# Patient Record
Sex: MALE | Race: WHITE | NOT HISPANIC OR LATINO | Employment: FULL TIME | URBAN - METROPOLITAN AREA
[De-identification: names, ages, dates, MRNs, and addresses within clinical notes are randomized per-mention and may not be internally consistent; named-entity substitution may affect disease eponyms.]

---

## 2020-02-11 ENCOUNTER — APPOINTMENT (OUTPATIENT)
Dept: RADIOLOGY | Facility: MEDICAL CENTER | Age: 45
End: 2020-02-11
Payer: OTHER MISCELLANEOUS

## 2020-02-11 ENCOUNTER — APPOINTMENT (OUTPATIENT)
Dept: URGENT CARE | Facility: MEDICAL CENTER | Age: 45
End: 2020-02-11
Payer: OTHER MISCELLANEOUS

## 2020-02-11 DIAGNOSIS — S20.211A CONTUSION OF RIB ON RIGHT SIDE, INITIAL ENCOUNTER: ICD-10-CM

## 2020-02-11 DIAGNOSIS — S20.211A CONTUSION OF RIB ON RIGHT SIDE, INITIAL ENCOUNTER: Primary | ICD-10-CM

## 2020-02-11 PROCEDURE — 99283 EMERGENCY DEPT VISIT LOW MDM: CPT | Performed by: PHYSICIAN ASSISTANT

## 2020-02-11 PROCEDURE — 71101 X-RAY EXAM UNILAT RIBS/CHEST: CPT

## 2020-02-11 PROCEDURE — G0382 LEV 3 HOSP TYPE B ED VISIT: HCPCS | Performed by: PHYSICIAN ASSISTANT

## 2020-02-14 ENCOUNTER — APPOINTMENT (OUTPATIENT)
Dept: URGENT CARE | Facility: MEDICAL CENTER | Age: 45
End: 2020-02-14
Payer: OTHER MISCELLANEOUS

## 2020-02-14 PROCEDURE — 99213 OFFICE O/P EST LOW 20 MIN: CPT | Performed by: PHYSICIAN ASSISTANT

## 2020-02-17 ENCOUNTER — APPOINTMENT (OUTPATIENT)
Dept: URGENT CARE | Facility: MEDICAL CENTER | Age: 45
End: 2020-02-17
Payer: OTHER MISCELLANEOUS

## 2020-02-17 PROCEDURE — 99213 OFFICE O/P EST LOW 20 MIN: CPT

## 2020-02-20 ENCOUNTER — APPOINTMENT (OUTPATIENT)
Dept: URGENT CARE | Age: 45
End: 2020-02-20
Payer: OTHER MISCELLANEOUS

## 2020-02-20 PROCEDURE — 99213 OFFICE O/P EST LOW 20 MIN: CPT | Performed by: PREVENTIVE MEDICINE

## 2020-02-27 ENCOUNTER — APPOINTMENT (OUTPATIENT)
Dept: URGENT CARE | Facility: MEDICAL CENTER | Age: 45
End: 2020-02-27
Payer: OTHER MISCELLANEOUS

## 2020-02-27 PROCEDURE — 99213 OFFICE O/P EST LOW 20 MIN: CPT | Performed by: PHYSICIAN ASSISTANT

## 2025-01-26 LAB
ATRIAL RATE: 84 BPM
BASOPHILS # BLD AUTO: 0.04 THOUSANDS/ΜL (ref 0–0.1)
BASOPHILS NFR BLD AUTO: 1 % (ref 0–1)
CARDIAC TROPONIN I PNL SERPL HS: 14 NG/L (ref ?–50)
EOSINOPHIL # BLD AUTO: 0.04 THOUSAND/ΜL (ref 0–0.61)
EOSINOPHIL NFR BLD AUTO: 1 % (ref 0–6)
ERYTHROCYTE [DISTWIDTH] IN BLOOD BY AUTOMATED COUNT: 13.1 % (ref 11.6–15.1)
FLUAV AG UPPER RESP QL IA.RAPID: POSITIVE
FLUBV AG UPPER RESP QL IA.RAPID: NEGATIVE
HCT VFR BLD AUTO: 39.8 % (ref 36.5–49.3)
HGB BLD-MCNC: 13.9 G/DL (ref 12–17)
IMM GRANULOCYTES # BLD AUTO: 0.01 THOUSAND/UL (ref 0–0.2)
IMM GRANULOCYTES NFR BLD AUTO: 0 % (ref 0–2)
LYMPHOCYTES # BLD AUTO: 1.48 THOUSANDS/ΜL (ref 0.6–4.47)
LYMPHOCYTES NFR BLD AUTO: 27 % (ref 14–44)
MCH RBC QN AUTO: 30.2 PG (ref 26.8–34.3)
MCHC RBC AUTO-ENTMCNC: 34.9 G/DL (ref 31.4–37.4)
MCV RBC AUTO: 87 FL (ref 82–98)
MONOCYTES # BLD AUTO: 0.94 THOUSAND/ΜL (ref 0.17–1.22)
MONOCYTES NFR BLD AUTO: 17 % (ref 4–12)
NEUTROPHILS # BLD AUTO: 3 THOUSANDS/ΜL (ref 1.85–7.62)
NEUTS SEG NFR BLD AUTO: 54 % (ref 43–75)
NRBC BLD AUTO-RTO: 0 /100 WBCS
P AXIS: 64 DEGREES
PLATELET # BLD AUTO: 197 THOUSANDS/UL (ref 149–390)
PMV BLD AUTO: 9.6 FL (ref 8.9–12.7)
PR INTERVAL: 138 MS
QRS AXIS: 80 DEGREES
QRSD INTERVAL: 88 MS
QT INTERVAL: 362 MS
QTC INTERVAL: 427 MS
RBC # BLD AUTO: 4.6 MILLION/UL (ref 3.88–5.62)
SARS-COV+SARS-COV-2 AG RESP QL IA.RAPID: NEGATIVE
T WAVE AXIS: 75 DEGREES
VENTRICULAR RATE: 84 BPM
WBC # BLD AUTO: 5.51 THOUSAND/UL (ref 4.31–10.16)

## 2025-01-26 PROCEDURE — 36415 COLL VENOUS BLD VENIPUNCTURE: CPT

## 2025-01-26 PROCEDURE — 93010 ELECTROCARDIOGRAM REPORT: CPT | Performed by: STUDENT IN AN ORGANIZED HEALTH CARE EDUCATION/TRAINING PROGRAM

## 2025-01-26 PROCEDURE — 85025 COMPLETE CBC W/AUTO DIFF WBC: CPT

## 2025-01-26 PROCEDURE — 99285 EMERGENCY DEPT VISIT HI MDM: CPT | Performed by: EMERGENCY MEDICINE

## 2025-01-26 PROCEDURE — 93005 ELECTROCARDIOGRAM TRACING: CPT

## 2025-01-26 PROCEDURE — 87811 SARS-COV-2 COVID19 W/OPTIC: CPT

## 2025-01-26 PROCEDURE — 80053 COMPREHEN METABOLIC PANEL: CPT

## 2025-01-26 PROCEDURE — 87804 INFLUENZA ASSAY W/OPTIC: CPT

## 2025-01-26 PROCEDURE — 99285 EMERGENCY DEPT VISIT HI MDM: CPT

## 2025-01-26 PROCEDURE — 84484 ASSAY OF TROPONIN QUANT: CPT

## 2025-01-27 ENCOUNTER — APPOINTMENT (EMERGENCY)
Dept: RADIOLOGY | Facility: HOSPITAL | Age: 50
End: 2025-01-27
Payer: COMMERCIAL

## 2025-01-27 ENCOUNTER — HOSPITAL ENCOUNTER (OUTPATIENT)
Facility: HOSPITAL | Age: 50
Setting detail: OBSERVATION
Discharge: HOME | End: 2025-01-27
Attending: EMERGENCY MEDICINE
Payer: COMMERCIAL

## 2025-01-27 VITALS
HEART RATE: 67 BPM | DIASTOLIC BLOOD PRESSURE: 73 MMHG | BODY MASS INDEX: 39.39 KG/M2 | SYSTOLIC BLOOD PRESSURE: 138 MMHG | WEIGHT: 251 LBS | TEMPERATURE: 99.3 F | OXYGEN SATURATION: 96 % | RESPIRATION RATE: 18 BRPM | HEIGHT: 67 IN

## 2025-01-27 DIAGNOSIS — R09.02 HYPOXIA: ICD-10-CM

## 2025-01-27 DIAGNOSIS — R07.9 CHEST PAIN: ICD-10-CM

## 2025-01-27 DIAGNOSIS — J44.1 COPD WITH ACUTE EXACERBATION (HCC): ICD-10-CM

## 2025-01-27 DIAGNOSIS — J10.1 INFLUENZA A: Primary | ICD-10-CM

## 2025-01-27 DIAGNOSIS — J44.9 COPD, MILD (HCC): ICD-10-CM

## 2025-01-27 PROBLEM — L30.9 DERMATITIS: Status: ACTIVE | Noted: 2023-11-22

## 2025-01-27 PROBLEM — J98.4 RESTRICTIVE LUNG DISEASE: Status: ACTIVE | Noted: 2024-03-18

## 2025-01-27 PROBLEM — G47.33 SEVERE OBSTRUCTIVE SLEEP APNEA: Status: ACTIVE | Noted: 2021-04-30

## 2025-01-27 PROBLEM — I95.9 HYPOTENSION: Status: RESOLVED | Noted: 2025-01-27 | Resolved: 2025-01-27

## 2025-01-27 PROBLEM — K04.7 DENTAL ABSCESS: Status: ACTIVE | Noted: 2025-01-27

## 2025-01-27 PROBLEM — F34.1 PERSISTENT DEPRESSIVE DISORDER: Status: ACTIVE | Noted: 2023-11-22

## 2025-01-27 PROBLEM — E78.5 HYPERLIPIDEMIA: Status: ACTIVE | Noted: 2020-08-05

## 2025-01-27 PROBLEM — I10 HYPERTENSION, ESSENTIAL: Status: ACTIVE | Noted: 2023-12-06

## 2025-01-27 PROBLEM — Z72.0 TOBACCO USE: Status: ACTIVE | Noted: 2020-08-05

## 2025-01-27 PROBLEM — Z82.49 FAMILY HISTORY OF HEART DISEASE IN MALE FAMILY MEMBER BEFORE AGE 55: Status: ACTIVE | Noted: 2021-03-22

## 2025-01-27 PROBLEM — Z86.16 HISTORY OF COVID-19: Status: ACTIVE | Noted: 2021-09-29

## 2025-01-27 PROBLEM — I95.9 HYPOTENSION: Status: ACTIVE | Noted: 2025-01-27

## 2025-01-27 LAB
4HR DELTA HS TROPONIN: -4 NG/L
ALBUMIN SERPL BCG-MCNC: 4.4 G/DL (ref 3.5–5)
ALP SERPL-CCNC: 45 U/L (ref 34–104)
ALT SERPL W P-5'-P-CCNC: 32 U/L (ref 7–52)
ANION GAP SERPL CALCULATED.3IONS-SCNC: 9 MMOL/L (ref 4–13)
AST SERPL W P-5'-P-CCNC: 22 U/L (ref 13–39)
BILIRUB SERPL-MCNC: 0.33 MG/DL (ref 0.2–1)
BNP SERPL-MCNC: 26 PG/ML (ref 0–100)
BUN SERPL-MCNC: 18 MG/DL (ref 5–25)
CALCIUM SERPL-MCNC: 8.7 MG/DL (ref 8.4–10.2)
CARDIAC TROPONIN I PNL SERPL HS: 10 NG/L (ref ?–50)
CARDIAC TROPONIN I PNL SERPL HS: 8 NG/L (ref ?–50)
CHLORIDE SERPL-SCNC: 100 MMOL/L (ref 96–108)
CO2 SERPL-SCNC: 26 MMOL/L (ref 21–32)
CREAT SERPL-MCNC: 0.94 MG/DL (ref 0.6–1.3)
D DIMER PPP FEU-MCNC: <0.27 UG/ML FEU
GFR SERPL CREATININE-BSD FRML MDRD: 93 ML/MIN/1.73SQ M
GLUCOSE SERPL-MCNC: 110 MG/DL (ref 65–140)
LACTATE SERPL-SCNC: 0.8 MMOL/L (ref 0.5–2)
MAGNESIUM SERPL-MCNC: 1.8 MG/DL (ref 1.9–2.7)
POTASSIUM SERPL-SCNC: 3.8 MMOL/L (ref 3.5–5.3)
PROCALCITONIN SERPL-MCNC: 0.12 NG/ML
PROT SERPL-MCNC: 7.1 G/DL (ref 6.4–8.4)
SODIUM SERPL-SCNC: 135 MMOL/L (ref 135–147)

## 2025-01-27 PROCEDURE — 94760 N-INVAS EAR/PLS OXIMETRY 1: CPT

## 2025-01-27 PROCEDURE — 83880 ASSAY OF NATRIURETIC PEPTIDE: CPT

## 2025-01-27 PROCEDURE — 84145 PROCALCITONIN (PCT): CPT

## 2025-01-27 PROCEDURE — 87040 BLOOD CULTURE FOR BACTERIA: CPT

## 2025-01-27 PROCEDURE — 71045 X-RAY EXAM CHEST 1 VIEW: CPT

## 2025-01-27 PROCEDURE — 96375 TX/PRO/DX INJ NEW DRUG ADDON: CPT

## 2025-01-27 PROCEDURE — 83735 ASSAY OF MAGNESIUM: CPT

## 2025-01-27 PROCEDURE — 99239 HOSP IP/OBS DSCHRG MGMT >30: CPT

## 2025-01-27 PROCEDURE — 84484 ASSAY OF TROPONIN QUANT: CPT

## 2025-01-27 PROCEDURE — 36415 COLL VENOUS BLD VENIPUNCTURE: CPT

## 2025-01-27 PROCEDURE — 94640 AIRWAY INHALATION TREATMENT: CPT

## 2025-01-27 PROCEDURE — 99222 1ST HOSP IP/OBS MODERATE 55: CPT | Performed by: NURSE PRACTITIONER

## 2025-01-27 PROCEDURE — 85379 FIBRIN DEGRADATION QUANT: CPT

## 2025-01-27 PROCEDURE — 96365 THER/PROPH/DIAG IV INF INIT: CPT

## 2025-01-27 PROCEDURE — 83605 ASSAY OF LACTIC ACID: CPT

## 2025-01-27 PROCEDURE — 94664 DEMO&/EVAL PT USE INHALER: CPT

## 2025-01-27 PROCEDURE — 96361 HYDRATE IV INFUSION ADD-ON: CPT

## 2025-01-27 RX ORDER — METOPROLOL SUCCINATE 50 MG/1
50 TABLET, EXTENDED RELEASE ORAL DAILY
COMMUNITY
Start: 2025-01-22 | End: 2025-04-22

## 2025-01-27 RX ORDER — SODIUM CHLORIDE FOR INHALATION 0.9 %
3 VIAL, NEBULIZER (ML) INHALATION
Status: DISCONTINUED | OUTPATIENT
Start: 2025-01-27 | End: 2025-01-27 | Stop reason: HOSPADM

## 2025-01-27 RX ORDER — GUAIFENESIN 100 MG/5ML
200 SOLUTION ORAL EVERY 4 HOURS PRN
Status: DISCONTINUED | OUTPATIENT
Start: 2025-01-27 | End: 2025-01-27 | Stop reason: HOSPADM

## 2025-01-27 RX ORDER — AMLODIPINE BESYLATE 5 MG/1
5 TABLET ORAL DAILY
COMMUNITY
Start: 2025-01-22

## 2025-01-27 RX ORDER — OSELTAMIVIR PHOSPHATE 75 MG/1
75 CAPSULE ORAL EVERY 12 HOURS SCHEDULED
Status: DISCONTINUED | OUTPATIENT
Start: 2025-01-27 | End: 2025-01-27 | Stop reason: HOSPADM

## 2025-01-27 RX ORDER — KETOROLAC TROMETHAMINE 30 MG/ML
15 INJECTION, SOLUTION INTRAMUSCULAR; INTRAVENOUS ONCE
Status: COMPLETED | OUTPATIENT
Start: 2025-01-27 | End: 2025-01-27

## 2025-01-27 RX ORDER — SODIUM CHLORIDE, SODIUM LACTATE, POTASSIUM CHLORIDE, CALCIUM CHLORIDE 600; 310; 30; 20 MG/100ML; MG/100ML; MG/100ML; MG/100ML
75 INJECTION, SOLUTION INTRAVENOUS CONTINUOUS
Status: DISCONTINUED | OUTPATIENT
Start: 2025-01-27 | End: 2025-01-27 | Stop reason: HOSPADM

## 2025-01-27 RX ORDER — GUAIFENESIN 100 MG/5ML
200 SOLUTION ORAL EVERY 4 HOURS PRN
Qty: 118 ML | Refills: 0 | Status: SHIPPED | OUTPATIENT
Start: 2025-01-27

## 2025-01-27 RX ORDER — PREDNISONE 20 MG/1
40 TABLET ORAL DAILY
Qty: 10 TABLET | Refills: 0 | Status: SHIPPED | OUTPATIENT
Start: 2025-01-27 | End: 2025-02-01

## 2025-01-27 RX ORDER — AZITHROMYCIN 250 MG/1
500 TABLET, FILM COATED ORAL ONCE
Status: COMPLETED | OUTPATIENT
Start: 2025-01-27 | End: 2025-01-27

## 2025-01-27 RX ORDER — ENOXAPARIN SODIUM 100 MG/ML
40 INJECTION SUBCUTANEOUS DAILY
Status: DISCONTINUED | OUTPATIENT
Start: 2025-01-27 | End: 2025-01-27 | Stop reason: HOSPADM

## 2025-01-27 RX ORDER — ASPIRIN 81 MG/1
81 TABLET ORAL DAILY
COMMUNITY
Start: 2025-01-22

## 2025-01-27 RX ORDER — LANOLIN ALCOHOL/MO/W.PET/CERES
400 CREAM (GRAM) TOPICAL 2 TIMES DAILY
Status: DISCONTINUED | OUTPATIENT
Start: 2025-01-27 | End: 2025-01-27 | Stop reason: HOSPADM

## 2025-01-27 RX ORDER — ALBUTEROL SULFATE 90 UG/1
2 INHALANT RESPIRATORY (INHALATION) EVERY 6 HOURS PRN
COMMUNITY
Start: 2025-01-22

## 2025-01-27 RX ORDER — ACETAMINOPHEN 325 MG/1
975 TABLET ORAL ONCE
Status: COMPLETED | OUTPATIENT
Start: 2025-01-27 | End: 2025-01-27

## 2025-01-27 RX ORDER — ONDANSETRON 2 MG/ML
4 INJECTION INTRAMUSCULAR; INTRAVENOUS ONCE
Status: COMPLETED | OUTPATIENT
Start: 2025-01-27 | End: 2025-01-27

## 2025-01-27 RX ORDER — LEVALBUTEROL INHALATION SOLUTION 1.25 MG/3ML
1.25 SOLUTION RESPIRATORY (INHALATION)
Status: DISCONTINUED | OUTPATIENT
Start: 2025-01-27 | End: 2025-01-27 | Stop reason: HOSPADM

## 2025-01-27 RX ORDER — ESCITALOPRAM OXALATE 5 MG/1
5 TABLET ORAL DAILY
COMMUNITY
Start: 2025-01-22 | End: 2025-02-21

## 2025-01-27 RX ORDER — ACETAMINOPHEN 325 MG/1
650 TABLET ORAL EVERY 6 HOURS PRN
Status: DISCONTINUED | OUTPATIENT
Start: 2025-01-27 | End: 2025-01-27 | Stop reason: HOSPADM

## 2025-01-27 RX ORDER — METHYLPREDNISOLONE SODIUM SUCCINATE 40 MG/ML
40 INJECTION, POWDER, LYOPHILIZED, FOR SOLUTION INTRAMUSCULAR; INTRAVENOUS EVERY 8 HOURS
Status: DISCONTINUED | OUTPATIENT
Start: 2025-01-27 | End: 2025-01-27 | Stop reason: HOSPADM

## 2025-01-27 RX ORDER — ATORVASTATIN CALCIUM 40 MG/1
40 TABLET, FILM COATED ORAL DAILY
COMMUNITY

## 2025-01-27 RX ORDER — OSELTAMIVIR PHOSPHATE 75 MG/1
75 CAPSULE ORAL EVERY 12 HOURS SCHEDULED
Qty: 9 CAPSULE | Refills: 0 | Status: SHIPPED | OUTPATIENT
Start: 2025-01-27 | End: 2025-02-01

## 2025-01-27 RX ORDER — ESCITALOPRAM OXALATE 20 MG/1
20 TABLET ORAL DAILY
COMMUNITY
Start: 2025-02-22 | End: 2025-08-21

## 2025-01-27 RX ORDER — OSELTAMIVIR PHOSPHATE 75 MG/1
75 CAPSULE ORAL ONCE
Status: COMPLETED | OUTPATIENT
Start: 2025-01-27 | End: 2025-01-27

## 2025-01-27 RX ORDER — ALBUTEROL SULFATE 90 UG/1
2 INHALANT RESPIRATORY (INHALATION) ONCE
Status: COMPLETED | OUTPATIENT
Start: 2025-01-27 | End: 2025-01-27

## 2025-01-27 RX ADMIN — IPRATROPIUM BROMIDE 0.5 MG: 0.5 SOLUTION RESPIRATORY (INHALATION) at 08:52

## 2025-01-27 RX ADMIN — SODIUM CHLORIDE 1000 ML: 0.9 INJECTION, SOLUTION INTRAVENOUS at 04:24

## 2025-01-27 RX ADMIN — AMOXICILLIN AND CLAVULANATE POTASSIUM 1 TABLET: 875; 125 TABLET, FILM COATED ORAL at 12:08

## 2025-01-27 RX ADMIN — SODIUM CHLORIDE, SODIUM LACTATE, POTASSIUM CHLORIDE, AND CALCIUM CHLORIDE 75 ML/HR: .6; .31; .03; .02 INJECTION, SOLUTION INTRAVENOUS at 11:27

## 2025-01-27 RX ADMIN — Medication 3 ML: at 08:53

## 2025-01-27 RX ADMIN — Medication 400 MG: at 11:21

## 2025-01-27 RX ADMIN — LEVALBUTEROL HYDROCHLORIDE 1.25 MG: 1.25 SOLUTION RESPIRATORY (INHALATION) at 08:53

## 2025-01-27 RX ADMIN — ACETAMINOPHEN 975 MG: 325 TABLET, FILM COATED ORAL at 03:06

## 2025-01-27 RX ADMIN — CEFTRIAXONE 2000 MG: 2 INJECTION, POWDER, FOR SOLUTION INTRAMUSCULAR; INTRAVENOUS at 04:21

## 2025-01-27 RX ADMIN — OSELTAMIVIR PHOSPHATE 75 MG: 75 CAPSULE ORAL at 03:08

## 2025-01-27 RX ADMIN — ALBUTEROL SULFATE 2 PUFF: 90 AEROSOL, METERED RESPIRATORY (INHALATION) at 03:08

## 2025-01-27 RX ADMIN — KETOROLAC TROMETHAMINE 15 MG: 30 INJECTION, SOLUTION INTRAMUSCULAR; INTRAVENOUS at 03:31

## 2025-01-27 RX ADMIN — AZITHROMYCIN DIHYDRATE 500 MG: 250 TABLET ORAL at 03:50

## 2025-01-27 RX ADMIN — METHYLPREDNISOLONE SODIUM SUCCINATE 40 MG: 40 INJECTION, POWDER, FOR SOLUTION INTRAMUSCULAR; INTRAVENOUS at 11:22

## 2025-01-27 RX ADMIN — ENOXAPARIN SODIUM 40 MG: 40 INJECTION SUBCUTANEOUS at 12:09

## 2025-01-27 RX ADMIN — SODIUM CHLORIDE 1000 ML: 0.9 INJECTION, SOLUTION INTRAVENOUS at 03:31

## 2025-01-27 RX ADMIN — ONDANSETRON 4 MG: 2 INJECTION INTRAMUSCULAR; INTRAVENOUS at 03:31

## 2025-01-27 NOTE — LETTER
[facility] EMERGENCY DEPARTMENT  [address]  [place] [address]  Dept: [phone]    January 27, 2025     Patient: Angel Luis Orozco   YOB: 1975   Date of Visit: 1/27/2025       To Whom it May Concern:    Angel Luis Orozco is under my professional care. He was seen in the hospital from 1/27/2025 to 01/27/25. He may return to work on 1/30 without limitations.    If you have any questions or concerns, please don't hesitate to call.         Sincerely,          Arin Suarez, DO

## 2025-01-27 NOTE — ED ATTENDING ATTESTATION
1/26/2025  I, Ta Granger MD, saw and evaluated the patient. I have discussed the patient with the resident/non-physician practitioner and agree with the resident's/non-physician practitioner's findings, Plan of Care, and MDM as documented in the resident's/non-physician practitioner's note, except where noted. All available labs and Radiology studies were reviewed.  I was present for key portions of any procedure(s) performed by the resident/non-physician practitioner and I was immediately available to provide assistance.       At this point I agree with the current assessment done in the Emergency Department.  I have conducted an independent evaluation of this patient a history and physical is as follows: Patient is a 49 year old male with cough, fever, nausea, fatigue since yesterday. No travel. No chest pain or sob. Was last seen at [facility] on 1/22/25 for annual physical exam. [employer] website checked on this patient and last Rx filled was on 11/14/23 for tramadol for 3 day supply. No urinary sx or diarrhea. No smoking. NCAT. No scleral icterus. Moist mucous membranes. ENT exam as per ED resident. Lungs with rhonci and occasional wheezing. Heart regular without murmur. Abdomen soft and nontender. (+) bowel sounds. No edema. No rash noted. DDx including but not limited to: viral illness, pneumonia, URI, OM, pharyngitis, influenza, COVID-19 (novel coronavirus); doubt cellulitis, UTI, meningitis, meningococcemia, sinusitis or cardiac etiology. Will check labs, EKG, CXR.      ED Course         Critical Care Time  Procedures

## 2025-01-27 NOTE — ASSESSMENT & PLAN NOTE
Completing course of augmentin due to dental abscess.  Has about 4 more days left.   Patient will schedule op follow up

## 2025-01-27 NOTE — H&P
H&P - Hospitalist   Name: Angel Luis Orozco 49 y.o. male I MRN: [id number]  Unit/Bed#: TRIAGE-01 I Date of Admission: 1/27/2025   Date of Service: 1/27/2025 I Hospital Day: 0     Assessment & Plan  Hypotension  92/63, improved with IV fluids   Continue IV fluids  Check orthostatics  Influenza A  Symptom onset 1/25.  Sick contacts at work.   Tamiflu   Supportive care   COPD with acute exacerbation (HCC)  Likely in setting of influenza  89-90% on room air  Solumedrol  Schedule nebs  Check ambulating pulse ox  Severe obstructive sleep apnea  Cpap hs  Dental abscess  Completing course of augmentin due to dental abscess.  Has about 4 more days left.   Patient will schedule op follow up  Hypertension, essential  Has not been on antihypertensives over the past year due to lapse in insurance  Agreeable to resume on dc once BP improves  Persistent depressive disorder  Has not been on lexapro for the past year due to lapse in insurance  Agreeable to resume on dc      VTE Pharmacologic Prophylaxis: VTE Score: 4 High Risk (Score >/= 5) - Pharmacological DVT Prophylaxis Ordered: enoxaparin (Lovenox). Sequential Compression Devices Ordered.  Code Status: Level 1 - Full Code   Discussion with family: Patient declined call to .     Anticipated Length of Stay: Patient will be admitted on an observation basis with an anticipated length of stay of less than 2 midnights secondary to hypotension.    History of Present Illness   Chief Complaint: fever, fatigue, cough    Angel Luis Orozco is a 49 y.o. male with a PMH of copd, htn, eliane on cpap who presents with two days of fever, fatigue, cough.  Reports he hasn't moved too much from the couch since Saturday.  Influenza A positive.  Notes sick contacts with co workers.  No SIRS criteria.  Noted to have initial blood pressure 92/63 which improved with IV fluids.  Per ED he has remained 89-90% on room air.  He has been placed on oxygen.  Ambulating pulse ox and orthostatics not yet  completed while in ED.      Patient is on augmentin as outpatient due to dental abscess.    Review of Systems   Constitutional:  Positive for appetite change, chills, fatigue and fever.   Respiratory:  Positive for cough, shortness of breath and wheezing.    All other systems reviewed and are negative.      Historical Information   History reviewed. No pertinent past medical history.  History reviewed. No pertinent surgical history.  Social History     Tobacco Use    Smoking status: Some Days     Types: Cigarettes    Smokeless tobacco: Never   Vaping Use    Vaping status: Not on file   Substance and Sexual Activity    Alcohol use: Never    Drug use: Never    Sexual activity: Not on file     E-Cigarette/Vaping     E-Cigarette/Vaping Substances    Nicotine No     THC No     CBD No     Flavoring No     Other No     Unknown No      History reviewed. No pertinent family history.  Social History:  Marital Status: /Civil Union   Occupation:   Patient Pre-hospital Living Situation: Home  Patient Pre-hospital Level of Mobility: walks  Patient Pre-hospital Diet Restrictions:     Meds/Allergies   I have reviewed home medications with patient personally.  Prior to Admission medications    Medication Sig Start Date End Date Taking? Authorizing Provider   albuterol (PROVENTIL HFA,VENTOLIN HFA) 90 mcg/act inhaler Inhale 2 puffs every 6 (six) hours as needed 1/22/25  Yes Historical Provider, MD   amLODIPine (NORVASC) 5 mg tablet Take 5 mg by mouth daily 1/22/25  Yes Historical Provider, MD   amoxicillin-clavulanate (AUGMENTIN) 875-125 mg per tablet Take 1 tablet by mouth 2 (two) times a day 1/11/25  Yes Historical Provider, MD   aspirin (ECOTRIN LOW STRENGTH) 81 mg EC tablet Take 81 mg by mouth daily 1/22/25  Yes Historical Provider, MD   escitalopram (LEXAPRO) 5 mg tablet Take 5 mg by mouth daily 1/22/25 2/21/25 Yes Historical Provider, MD   metoprolol succinate (TOPROL-XL) 50 mg 24 hr tablet Take 50 mg by mouth daily  1/22/25 4/22/25 Yes Historical Provider, MD   atorvastatin (LIPITOR) 40 mg tablet Take 40 mg by mouth daily    Historical Provider, MD   escitalopram (LEXAPRO) 20 mg tablet Take 20 mg by mouth daily Do not start before February 22, 2025. 2/22/25 8/21/25  Historical Provider, MD     No Known Allergies    Objective :  Temp:  [99.3 °F (37.4 °C)] 99.3 °F (37.4 °C)  HR:  [79-87] 79  BP: ()/(63) 110/63  Resp:  [18-22] 18  SpO2:  [90 %-97 %] 90 %  O2 Device: None (Room air)    Physical Exam  Constitutional:       General: He is not in acute distress.     Appearance: Normal appearance. He is not ill-appearing.   HENT:      Head: Normocephalic and atraumatic.      Right Ear: External ear normal.      Left Ear: External ear normal.      Nose: Nose normal.      Mouth/Throat:      Pharynx: Oropharynx is clear.   Eyes:      Extraocular Movements: Extraocular movements intact.      Conjunctiva/sclera: Conjunctivae normal.   Cardiovascular:      Rate and Rhythm: Normal rate and regular rhythm.      Pulses: Normal pulses.      Heart sounds: Normal heart sounds.   Pulmonary:      Effort: Pulmonary effort is normal.      Breath sounds: Wheezing present.   Abdominal:      General: Bowel sounds are normal.      Palpations: Abdomen is soft.   Musculoskeletal:         General: Normal range of motion.      Cervical back: Normal range of motion.   Skin:     General: Skin is warm.      Capillary Refill: Capillary refill takes less than 2 seconds.   Neurological:      General: No focal deficit present.      Mental Status: He is alert and oriented to person, place, and time.   Psychiatric:         Behavior: Behavior normal.          Lines/Drains:            Lab Results: I have reviewed the following results:  Results from last 7 days   Lab Units 01/26/25  2313   WBC Thousand/uL 5.51   HEMOGLOBIN g/dL 13.9   HEMATOCRIT % 39.8   PLATELETS Thousands/uL 197   SEGS PCT % 54   LYMPHO PCT % 27   MONO PCT % 17*   EOS PCT % 1     Results from  last 7 days   Lab Units 01/26/25  2313   SODIUM mmol/L 135   POTASSIUM mmol/L 3.8   CHLORIDE mmol/L 100   CO2 mmol/L 26   BUN mg/dL 18   CREATININE mg/dL 0.94   ANION GAP mmol/L 9   CALCIUM mg/dL 8.7   ALBUMIN g/dL 4.4   TOTAL BILIRUBIN mg/dL 0.33   ALK PHOS U/L 45   ALT U/L 32   AST U/L 22   GLUCOSE RANDOM mg/dL 110             Lab Results   Component Value Date    HGBA1C 6 (H) 11/06/2023    HGBA1C 6 (H) 05/02/2022    HGBA1C 6.4 (H) 04/29/2021     Results from last 7 days   Lab Units 01/27/25  0412   LACTIC ACID mmol/L 0.8   PROCALCITONIN ng/ml 0.12       Imaging Results Review: I reviewed radiology reports from this admission including: chest xray.  Other Study Results Review: EKG was reviewed.  EKG was personally reviewed and my interpretation is: Personally Reviewed. NSR. HR 84, PACs..    Administrative Statements   I have spent a total time of   minutes in caring for this patient on the day of the visit/encounter including Diagnostic results, Prognosis, Risks and benefits of tx options, Instructions for management, Patient and family education, Importance of tx compliance, Risk factor reductions, Impressions, Counseling / Coordination of care, Documenting in the medical record, Reviewing / ordering tests, medicine, procedures  , Obtaining or reviewing history  , and Communicating with other healthcare professionals .    ** Please Note: This note has been constructed using a voice recognition system. **

## 2025-01-27 NOTE — ASSESSMENT & PLAN NOTE
Likely in setting of influenza  Mild exacerbation, patient saturating well on room air  Will discharge patient to home with 5 days of prednisone

## 2025-01-27 NOTE — ED PROVIDER NOTES
Time reflects when diagnosis was documented in both MDM as applicable and the Disposition within this note       Time User Action Codes Description Comment    1/27/2025  6:36 AM WaliDusty Add [R68.89] Flu-like symptoms     1/27/2025  6:36 AM Dusty Keyes Remove [R68.89] Flu-like symptoms     1/27/2025  6:36 AM Dusty Keyes Add [J10.1] Influenza A     1/27/2025  6:36 AM WaliDusty weber Add [R09.02] Hypoxia     1/27/2025  6:36 AM WaliDusty weber Add [R07.9] Chest pain           ED Disposition       ED Disposition   Admit    Condition   Stable    Date/Time   Mon Jan 27, 2025  6:36 AM    Comment   Case was discussed with [facility] and the patient's admission status was agreed to be Admission Status: observation status to the service of Dr. Ko  .               Assessment & Plan       Medical Decision Making  49-year-old male presenting to the ED with flulike symptoms.    DDx including but not limited to: viral illness, pneumonia, URI, OM, pharyngitis, influenza, cellulitis, UTI, meningitis, meningococcemia, sinusitis, Lyme disease, West Nile virus, COVID-19 (novel coronavirus).     Patient is endorsing chest pain as well, considering ACS, electrolyte abnormality, MI.    Will get a CBC, CMP, troponin, EKG, viral swab as well as a chest x-ray.  See ED course for interpretation results.    During workup, on reassessment of patient patient was found to be persistently hypoxic requiring nasal cannula oxygen.  Completion of sepsis workup done.  Likely pneumonia in nature.  See ED course for continued assessment of results.    Patient started on Rocephin, azithromycin, normal saline.    Patient admitted to [facility] for continued observation and management.    Amount and/or Complexity of Data Reviewed  Labs: ordered. Decision-making details documented in ED Course.  Radiology: independent interpretation performed.    Risk  OTC drugs.  Prescription drug management.  Decision regarding  hospitalization.        ED Course as of 01/27/25 0641 Mon Jan 27, 2025 0314 Comprehensive metabolic panel  Unremarkable and reassuring CMP   0314 hs TnI 0hr: 14  Will get 2-hour Trope   0314 Influenza A Rapid Antigen(!): Positive   0314 CBC and differential(!)  No elevated white count, no left shift, no anemia.   0346 While at bedside, nursing staff getting a decreased oxygen saturation, will start patient on antibiotics, give fluids, add sepsis labs.   0459 LACTIC ACID: 0.8  Reassuring   0541 BNP: 26  Reassuring   0541 Procalcitonin: 0.12  Reassuring   0541 hs TnI 4hr: 10  Reassuring   0623 D-Dimer, Quant: <0.27  Negative, no need for CT PE study.       Medications   acetaminophen (TYLENOL) tablet 975 mg (975 mg Oral Given 1/27/25 0306)   oseltamivir (TAMIFLU) capsule 75 mg (75 mg Oral Given 1/27/25 0308)   ondansetron (ZOFRAN) injection 4 mg (4 mg Intravenous Given 1/27/25 0331)   ketorolac (TORADOL) injection 15 mg (15 mg Intravenous Given 1/27/25 0331)   sodium chloride 0.9 % bolus 1,000 mL (0 mL Intravenous Stopped 1/27/25 0431)   albuterol (PROVENTIL HFA,VENTOLIN HFA) inhaler 2 puff (2 puffs Inhalation Given 1/27/25 0308)   ceftriaxone (ROCEPHIN) 2 g/50 mL in dextrose IVPB (0 mg Intravenous Stopped 1/27/25 0451)   azithromycin (ZITHROMAX) tablet 500 mg (500 mg Oral Given 1/27/25 0350)   sodium chloride 0.9 % bolus 1,000 mL (0 mL Intravenous Stopped 1/27/25 0524)       ED Risk Strat Scores   HEART Risk Score      Flowsheet Row Most Recent Value   Heart Score Risk Calculator    History 1 Filed at: 01/27/2025 0606   ECG 1 Filed at: 01/27/2025 0606   Age 1 Filed at: 01/27/2025 0606   Risk Factors 1 Filed at: 01/27/2025 0606   Troponin 1 Filed at: 01/27/2025 0606   HEART Score 5 Filed at: 01/27/2025 0606          HEART Risk Score      Flowsheet Row Most Recent Value   Heart Score Risk Calculator    History 1 Filed at: 01/27/2025 0606   ECG 1 Filed at: 01/27/2025 0606   Age 1 Filed at: 01/27/2025 0606   Risk  Factors 1 Filed at: 01/27/2025 0606   Troponin 1 Filed at: 01/27/2025 0606   HEART Score 5 Filed at: 01/27/2025 0606                            SBIRT 22yo+      Flowsheet Row Most Recent Value   Initial Alcohol Screen: US AUDIT-C     1. How often do you have a drink containing alcohol? 0 Filed at: 01/26/2025 2308   2. How many drinks containing alcohol do you have on a typical day you are drinking?  0 Filed at: 01/26/2025 2308   3a. Male UNDER 65: How often do you have five or more drinks on one occasion? 0 Filed at: 01/26/2025 2308   3b. FEMALE Any Age, or MALE 65+: How often do you have 4 or more drinks on one occassion? 0 Filed at: 01/26/2025 2308   Audit-C Score 0 Filed at: 01/26/2025 2308   POONAM: How many times in the past year have you...    Used an illegal drug or used a prescription medication for non-medical reasons? Never Filed at: 01/26/2025 2308            Wells' Criteria for PE      Flowsheet Row Most Recent Value   Wells' Criteria for PE    Clinical signs and symptoms of DVT 0 Filed at: 01/27/2025 0640   PE is primary diagnosis or equally likely 0 Filed at: 01/27/2025 0640   HR >100 0 Filed at: 01/27/2025 0640   Immobilization at least 3 days or Surgery in the previous 4 weeks 0 Filed at: 01/27/2025 0640   Previous, objectively diagnosed PE or DVT 0 Filed at: 01/27/2025 0640   Hemoptysis 0 Filed at: 01/27/2025 0640   Malignancy with treatment within 6 months or palliative 0 Filed at: 01/27/2025 0640   Wells' Criteria Total 0 Filed at: 01/27/2025 0640                        History of Present Illness       Chief Complaint   Patient presents with    Flu Symptoms     Patient comes in reporting generalized fatigue, lethargy, cough and fever. Reports temp of 101 at home. +Nausea. Last medicated w/ dayquil in AM.        History reviewed. No pertinent past medical history.   History reviewed. No pertinent surgical history.   History reviewed. No pertinent family history.   Social History     Tobacco Use     Smoking status: Some Days     Types: Cigarettes    Smokeless tobacco: Never   Substance Use Topics    Alcohol use: Never    Drug use: Never      E-Cigarette/Vaping      E-Cigarette/Vaping Substances    Nicotine No     THC No     CBD No     Flavoring No     Other No     Unknown No       I have reviewed and agree with the history as documented.     49-year-old male with significant history of COPD presenting to the ED with cough, fever, nausea, fatigue for the past 12 hours.  Patient states that he does have a cough and is having difficulty getting a breath in.  Patient noted that he had a temperature of 101 at home and attempted DayQuil, Tylenol, ibuprofen but with no relief.  Patient denies any recent travel or sick contacts.        Review of Systems   Constitutional:  Positive for fever. Negative for chills.   HENT:  Negative for congestion and rhinorrhea.    Eyes:  Negative for visual disturbance.   Respiratory:  Positive for cough. Negative for chest tightness and shortness of breath.    Cardiovascular:  Negative for chest pain and palpitations.   Gastrointestinal:  Positive for nausea and vomiting (Hematemesis). Negative for abdominal pain and diarrhea.   Genitourinary:  Negative for dysuria.   Musculoskeletal:  Negative for back pain and neck pain.   Neurological:  Positive for weakness. Negative for dizziness and headaches.   Psychiatric/Behavioral:  Negative for agitation and confusion.            Objective       ED Triage Vitals   Temperature Pulse Blood Pressure Respirations SpO2 Patient Position - Orthostatic VS   01/26/25 2305 01/26/25 2305 01/26/25 2305 01/26/25 2305 01/26/25 2305 01/26/25 2305   99.3 °F (37.4 °C) 85 92/63 22 92 % Sitting      Temp Source Heart Rate Source BP Location FiO2 (%) Pain Score    01/26/25 2305 01/26/25 2305 01/26/25 2305 -- 01/27/25 0331    Oral Monitor Left arm  Med Not Given for Pain - for MAR use only      Vitals      Date and Time Temp Pulse SpO2 Resp BP Pain Score FACES  Pain Rating User   01/27/25 0334 -- 79 90 % 18 110/63 -- -- NB   01/27/25 0331 -- -- -- -- -- Med Not Given for Pain - for MAR use only -- NB   01/27/25 0013 -- 87 97 % 20 119/63 -- --    01/26/25 2305 99.3 °F (37.4 °C) 85 92 % 22 92/63 -- --             Physical Exam  Constitutional:       Appearance: He is ill-appearing.   HENT:      Head: Normocephalic and atraumatic.      Right Ear: External ear normal.      Left Ear: External ear normal.      Nose: Nose normal.      Mouth/Throat:      Pharynx: Oropharynx is clear.   Eyes:      Extraocular Movements: Extraocular movements intact.      Pupils: Pupils are equal, round, and reactive to light.   Cardiovascular:      Rate and Rhythm: Normal rate and regular rhythm.      Pulses: Normal pulses.      Heart sounds: Normal heart sounds.   Pulmonary:      Effort: Pulmonary effort is normal.      Breath sounds: Normal breath sounds.   Abdominal:      General: Bowel sounds are normal.      Palpations: Abdomen is soft.   Musculoskeletal:         General: Normal range of motion.      Cervical back: Normal range of motion.   Skin:     General: Skin is warm.      Capillary Refill: Capillary refill takes less than 2 seconds.   Neurological:      General: No focal deficit present.      Mental Status: He is alert and oriented to person, place, and time.   Psychiatric:         Mood and Affect: Mood normal.         Behavior: Behavior normal.         Results Reviewed       Procedure Component Value Units Date/Time    D-dimer, quantitative [[id number]]  (Normal) Collected: 01/27/25 0412    Lab Status: Final result Specimen: Blood from Arm, Right Updated: 01/27/25 0616     D-Dimer, Quant <0.27 ug/ml FEU     Narrative:      In the evaluation for possible pulmonary embolism, in the appropriate (Well's Score of 4 or less) patient, the age adjusted d-dimer cutoff for this patient can be calculated as:    Age x 0.01 (in ug/mL) for Age-adjusted D-dimer exclusion threshold for a patient  over 50 years.    HS Troponin 0hr (reflex protocol) [[id number]]     Lab Status: No result Specimen: Blood     Magnesium [[id number]]  (Abnormal) Collected: 01/27/25 0412    Lab Status: Final result Specimen: Blood from Arm, Right Updated: 01/27/25 0545     Magnesium 1.8 mg/dL     Procalcitonin [[id number]]  (Normal) Collected: 01/27/25 0412    Lab Status: Final result Specimen: Blood from Arm, Right Updated: 01/27/25 0505     Procalcitonin 0.12 ng/ml     B-Type Natriuretic Peptide(BNP) [[id number]]  (Normal) Collected: 01/27/25 0412    Lab Status: Final result Specimen: Blood from Arm, Right Updated: 01/27/25 0503     BNP 26 pg/mL     Lactic acid, plasma (w/reflex if result > 2.0) [[id number]]  (Normal) Collected: 01/27/25 0412    Lab Status: Final result Specimen: Blood from Arm, Right Updated: 01/27/25 0456     LACTIC ACID 0.8 mmol/L     Narrative:      Result may be elevated if tourniquet was used during collection.    Blood culture #1 [[id number]] Collected: 01/27/25 0418    Lab Status: In process Specimen: Blood from Arm, Left Updated: 01/27/25 0431    Blood culture #2 [[id number]] Collected: 01/27/25 0412    Lab Status: In process Specimen: Blood from Arm, Right Updated: 01/27/25 0431    HS Troponin I 4hr [[id number]]  (Normal) Collected: 01/27/25 0315    Lab Status: Final result Specimen: Blood from Arm, Left Updated: 01/27/25 0349     hs TnI 4hr 10 ng/L      Delta 4hr hsTnI -4 ng/L     Comprehensive metabolic panel [[id number]] Collected: 01/26/25 2313    Lab Status: Final result Specimen: Blood from Arm, Right Updated: 01/27/25 0001     Sodium 135 mmol/L      Potassium 3.8 mmol/L      Chloride 100 mmol/L      CO2 26 mmol/L      ANION GAP 9 mmol/L      BUN 18 mg/dL      Creatinine 0.94 mg/dL      Glucose 110 mg/dL      Calcium 8.7 mg/dL      AST 22 U/L      ALT 32 U/L      Alkaline Phosphatase 45 U/L      Total Protein 7.1 g/dL      Albumin 4.4 g/dL      Total Bilirubin 0.33 mg/dL      eGFR 93 ml/min/1.73sq m      Narrative:      National Kid[employer] Disease Foundation guidelines for Chronic Kidney Disease (CKD):     Stage 1 with normal or high GFR (GFR > 90 mL/min/1.73 square meters)    Stage 2 Mild CKD (GFR = 60-89 mL/min/1.73 square meters)    Stage 3A Moderate CKD (GFR = 45-59 mL/min/1.73 square meters)    Stage 3B Moderate CKD (GFR = 30-44 mL/min/1.73 square meters)    Stage 4 Severe CKD (GFR = 15-29 mL/min/1.73 square meters)    Stage 5 End Stage CKD (GFR <15 mL/min/1.73 square meters)  Note: GFR calculation is accurate only with a steady state creatinine    HS Troponin I 2hr [[id number]]     Lab Status: No result Specimen: Blood     HS Troponin 0hr (reflex protocol) [[id number]]  (Normal) Collected: 01/26/25 2313    Lab Status: Final result Specimen: Blood from Arm, Right Updated: 01/26/25 2352     hs TnI 0hr 14 ng/L     FLU/COVID Rapid Antigen (30 min. TAT) - Preferred screening test in ED [[id number]]  (Abnormal) Collected: 01/26/25 2313    Lab Status: Final result Specimen: Nares from Nose Updated: 01/26/25 2343     SARS COV Rapid Antigen Negative     Influenza A Rapid Antigen Positive     Influenza B Rapid Antigen Negative    Narrative:      This test has been performed using the [employer] Avis 2 FLU+SARS Antigen test under the Emergency Use Authorization (EUA). This test has been validated by the  and verified by the performing laboratory. The Avis uses lateral flow immunofluorescent sandwich assay to detect SARS-COV, Influenza A and Influenza B Antigen.     The Quidel Avis 2 SARS Antigen test does not differentiate between SARS-CoV and SARS-CoV-2.     Negative results are presumptive and may be confirmed with a molecular assay, if necessary, for patient management. Negative results do not rule out SARS-CoV-2 or influenza infection and should not be used as the sole basis for treatment or patient management decisions. A negative test result may occur if the level of antigen in a sample is below the limit of  detection of this test.     Positive results are indicative of the presence of viral antigens, but do not rule out bacterial infection or co-infection with other viruses.     All test results should be used as an adjunct to clinical observations and other information available to the provider.    FOR PEDIATRIC PATIENTS - copy/paste COVID Guidelines URL to browser: [email or web address]    CBC and differential [[id number]]  (Abnormal) Collected: 01/26/25 2313    Lab Status: Final result Specimen: Blood from Arm, Right Updated: 01/26/25 2324     WBC 5.51 Thousand/uL      RBC 4.60 Million/uL      Hemoglobin 13.9 g/dL      Hematocrit 39.8 %      MCV 87 fL      MCH 30.2 pg      MCHC 34.9 g/dL      RDW 13.1 %      MPV 9.6 fL      Platelets 197 Thousands/uL      nRBC 0 /100 WBCs      Segmented % 54 %      Immature Grans % 0 %      Lymphocytes % 27 %      Monocytes % 17 %      Eosinophils Relative 1 %      Basophils Relative 1 %      Absolute Neutrophils 3.00 Thousands/µL      Absolute Immature Grans 0.01 Thousand/uL      Absolute Lymphocytes 1.48 Thousands/µL      Absolute Monocytes 0.94 Thousand/µL      Eosinophils Absolute 0.04 Thousand/µL      Basophils Absolute 0.04 Thousands/µL             XR chest 1 view portable   ED Interpretation by Dusty Mackay MD (01/27 0630)   Left-sided pneumonia          ECG 12 Lead Documentation Only    Date/Time: 1/27/2025 3:00 AM    Performed by: Dusty Mackay MD  Authorized by: Dusty Mackay MD    Indications / Diagnosis:  Hematemesis  ECG reviewed by me, the ED Provider: yes    Patient location:  ED  Previous ECG:     Previous ECG:  Unavailable  Interpretation:     Interpretation: abnormal    Rate:     ECG rate:  84    ECG rate assessment: normal    Rhythm:     Rhythm: sinus rhythm    Ectopy:     Ectopy: PAC    QRS:     QRS axis:  Normal    QRS intervals:  Normal  Conduction:     Conduction: normal    ST segments:     ST  segments:  Normal  T waves:     T waves: normal        ED Medication and Procedure Management   None     Patient's Medications    No medications on file     No discharge procedures on file.  ED SEPSIS DOCUMENTATION   Time reflects when diagnosis was documented in both MDM as applicable and the Disposition within this note       Time User Action Codes Description Comment    1/27/2025  6:36 AM Dusty Mackay [R68.89] Flu-like symptoms     1/27/2025  6:36 AM Dusty Mackay Remove [R68.89] Flu-like symptoms     1/27/2025  6:36 AM Dusty Mackay [J10.1] Influenza A     1/27/2025  6:36 AM Dusty Mackay [R09.02] Hypoxia     1/27/2025  6:36 AM Dusty Mackay [R07.9] Chest pain                  Dusty Mackay MD  01/27/25 0641

## 2025-01-27 NOTE — ASSESSMENT & PLAN NOTE
Has not been on antihypertensives over the past year due to lapse in insurance  Agreeable to resume on dc once BP improves

## 2025-01-27 NOTE — DISCHARGE SUMMARY
Discharge Summary - Hospitalist   Name: Angel Luis Orozco 49 y.o. male I MRN: [id number]  Unit/Bed#: ED-39 I Date of Admission: 1/27/2025   Date of Service: 1/27/2025 I Hospital Day: 0     Assessment & Plan  Influenza A  Symptom onset 1/25.  Sick contacts at work.   Tamiflu x 5 days  Prednisone x 5 days  Robitussin  Hypotension (Resolved: 1/27/2025)  92/63, improved with IV fluids   Encourage oral hydration at home  COPD with acute exacerbation (HCC)  Likely in setting of influenza  Mild exacerbation, patient saturating well on room air  Will discharge patient to home with 5 days of prednisone  Severe obstructive sleep apnea  Cpap hs  Dental abscess  Completing course of augmentin due to dental abscess.  Has about 4 more days left.   Patient will schedule op follow up  Hypertension, essential  Will resume antihypertensives on discharge  Persistent depressive disorder  Has not been on lexapro for the past year due to lapse in insurance  Agreeable to resume on dc     Medical Problems       Resolved Problems  Date Reviewed: 1/27/2025          Resolved    Hypotension 1/27/2025     Resolved by  Arin Suarez DO        Discharging Physician / Practitioner: Arin Suarez DO  PCP: Rob Kohli MD  Admission Date:   Admission Orders (From admission, onward)       Ordered        01/27/25 0637  Place in Observation  Once                          Discharge Date: 01/27/25    Complications: None    Reason for Admission: Flu a    Hospital Course:   Angel Luis Orozco is a 49 y.o. male patient who originally presented to the hospital on 1/27/2025 due to generalized malaise, cough, fever.  Influenza A positive.  Saturating well on room air, however has some slight wheezing.  Will discharge home with flu and oral prednisone x 5 days.    Please see above list of diagnoses and related plan for additional information.     Condition at Discharge: stable    Discharge Day Visit / Exam:   Subjective: Patient feels a bit better  "today, however still with malaise.  Endorses some wheezing  Vitals: Blood Pressure: (!) 179/89 (01/27/25 0725)  Pulse: 60 (01/27/25 0725)  Temperature: 99.3 °F (37.4 °C) (01/26/25 2305)  Temp Source: Oral (01/26/25 2305)  Respirations: 18 (01/27/25 0725)  Height: 5' 7\" (170.2 cm) (01/27/25 1125)  Weight - Scale: 114 kg (251 lb) (01/27/25 1125)  SpO2: 96 % (01/27/25 0853)  Physical Exam  Vitals and nursing note reviewed.   Constitutional:       General: He is not in acute distress.     Appearance: He is well-developed.   HENT:      Head: Normocephalic and atraumatic.   Eyes:      Conjunctiva/sclera: Conjunctivae normal.   Cardiovascular:      Rate and Rhythm: Normal rate and regular rhythm.      Heart sounds: No murmur heard.  Pulmonary:      Effort: Pulmonary effort is normal. No respiratory distress.      Breath sounds: Wheezing present.   Abdominal:      General: There is no distension.      Palpations: Abdomen is soft.      Tenderness: There is no abdominal tenderness.   Musculoskeletal:         General: No swelling.      Cervical back: Neck supple.      Right lower leg: No edema.   Skin:     General: Skin is warm and dry.   Neurological:      Mental Status: He is alert.   Psychiatric:         Mood and Affect: Mood normal.          Discussion with Family: Patient declined call to .     Discharge instructions/Information to patient and family:   See after visit summary for information provided to patient and family.      Provisions for Follow-Up Care:  See after visit summary for information related to follow-up care and any pertinent home health orders.      Mobility at time of Discharge:      HLM Goal achieved. Continue to encourage appropriate mobility.     Disposition:   Home    Planned Readmission: no    Discharge Medications:  See after visit summary for reconciled discharge medications provided to patient and/or family.      Administrative Statements   Discharge Statement:  I have spent a " total time of 45 minutes in caring for this patient on the day of the visit/encounter. .    **Please Note: This note may have been constructed using a voice recognition system**

## 2025-01-27 NOTE — DISCHARGE INSTR - AVS FIRST PAGE
Please try to hydrate yourself and eat if possible.   Please take Tamiflu and prednisone for 5 days.  The prednisone can start tomorrow, and the Tamiflu should be this evening.   If you experience worsening shortness of breath, wheezing and your inhaler is not working, please come back to the hospital.  You should follow-up with your pulmonologist as soon as you are able.    You can resume your blood pressure medicines when you feel better.

## 2025-01-27 NOTE — ASSESSMENT & PLAN NOTE
Likely in setting of influenza  89-90% on room air  Solumedrol  Schedule nebs  Check ambulating pulse ox

## 2025-02-01 LAB
BACTERIA BLD CULT: NORMAL
BACTERIA BLD CULT: NORMAL